# Patient Record
(demographics unavailable — no encounter records)

---

## 2025-03-15 NOTE — LETTER BODY
[FreeTextEntry1] : Malena Farmer MD [address] [place] [address] [phone]  Dear Dr. Farmer,   Reason for visit: Urinary frequency.   This is a 55 year-old gentle with urinary frequency and urgency referred for evaluation. Patient reports urinary frequency and urgency every 1-2 hours. He denies any gross hematuria, dysuria or urinary incontinence. The patient does not smoke. His symptoms are aggravated by hydration. He has occasional nocturia. The patient denies any relieving factors. The patient denies any interference of function. The patient is entirely asymptomatic. He denies any history of glaucoma. All other review of systems are negative. He has no cancer in her family medical history. He has no previous surgical history. Past medical history, family history and social history were inquired and were noncontributory to current condition. The patient does not use tobacco or drink alcohol. Medications and allergies were reviewed. He has no known allergies to medication.   On examination, the patient is a well-appearing male in no acute distress. He is alert and oriented follows commands. He has normal mood and affect. He is normocephalic. Neck is supple. Oral no thrush. Respirations are unlabored. His abdomen is soft and nontender. Bladder is nonpalpable. No CVA tenderness. Neurologically he is grossly intact. No peripheral edema. Skin without gross abnormality.    ASSESSMENT: Urinary frequency.   I counseled the patient. I discussed the various etiologies of urinary frequency. I discussed the management options of the will to the patient. Given the moderate symptoms, I recommend that the patient begin a trial of Trospium. I discussed the potential side effects of the medication. I counseled the patient on its use and side effects. If the patient develops any side effects, the patient will discontinue the medication and contact me.  I recommended the patient obtain urinalysis and urine culture to evaluate for infections. He will also obtain PSA and CMP to establish baselines. Risks and alternatives were discussed. I answered the patient's questions. The patient will follow-up as directed and will contact me with any questions or concerns. Thank you for the opportunity to participate in the care of Mr. BURGESS. I will keep you updated on her progress.   PLAN: Trial of Trospium. Urinalysis. Urine culture. PSA. CMP. Follow up in one month.

## 2025-03-15 NOTE — LETTER BODY
[FreeTextEntry1] : Malena Farmer MD [address] [place] [address] [phone]  Dear Dr. Farmer,   Reason for visit: Urinary frequency.   This is a 55 year-old gentle with urinary frequency and urgency referred for evaluation. Patient reports urinary frequency and urgency every 1-2 hours. He denies any gross hematuria, dysuria or urinary incontinence. The patient does not smoke. His symptoms are aggravated by hydration. He has occasional nocturia. The patient denies any relieving factors. The patient denies any interference of function. The patient is entirely asymptomatic. He denies any history of glaucoma. All other review of systems are negative. He has no cancer in her family medical history. He has no previous surgical history. Past medical history, family history and social history were inquired and were noncontributory to current condition. The patient does not use tobacco or drink alcohol. Medications and allergies were reviewed. He has no known allergies to medication.   On examination, the patient is a well-appearing male in no acute distress. He is alert and oriented follows commands. He has normal mood and affect. He is normocephalic. Neck is supple. Oral no thrush. Respirations are unlabored. His abdomen is soft and nontender. Bladder is nonpalpable. No CVA tenderness. Neurologically he is grossly intact. No peripheral edema. Skin without gross abnormality.    ASSESSMENT: Urinary frequency.   I counseled the patient. I discussed the various etiologies of urinary frequency. I discussed the management options of the will to the patient. Given the moderate symptoms, I recommend that the patient begin a trial of Trospium. I discussed the potential side effects of the medication. I counseled the patient on its use and side effects. If the patient develops any side effects, the patient will discontinue the medication and contact me.  I recommended the patient obtain urinalysis and urine culture to evaluate for infections. He will also obtain PSA and CMP to establish baselines. Risks and alternatives were discussed. I answered the patient's questions. The patient will follow-up as directed and will contact me with any questions or concerns. Thank you for the opportunity to participate in the care of Mr. BURGESS. I will keep you updated on her progress.   PLAN: Trial of Trospium. Urinalysis. Urine culture. PSA. CMP. Follow up in one month.

## 2025-03-15 NOTE — ADDENDUM
[FreeTextEntry1] : Entered by Yo Holloway, acting as scribe for Dr. Gustavo Arizmendi. The documentation recorded by the scribe accurately reflects the service I personally performed and the decisions made by me.

## 2025-05-02 NOTE — LETTER BODY
[FreeTextEntry1] : Malena Farmer MD [address] [place] [address] [phone]  Dear Dr. Farmer,   Reason for visit: Urinary frequency. Proteinuria.  This is a 55 year-old gentle with urinary frequency and proteinuria. Patient returns today for follow-up. Since she was last seen, patient did not start Trospium as directed. However, he states that his urinary symptoms resolved spontaneously without medical therapy. His recent urinalysis demonstrated trace proteinuria, 30 mg/dL. He denies any gross hematuria, dysuria or urinary incontinence. The patient does not smoke. His symptoms are aggravated by hydration. He has occasional nocturia. The patient denies any relieving factors. The patient denies any interference of function. The patient is entirely asymptomatic. He denies any history of glaucoma. All other review of systems are negative. He has no cancer in her family medical history. He has no previous surgical history. Past medical history, family history and social history were inquired and were noncontributory to current condition. The patient does not use tobacco or drink alcohol. Medications and allergies were reviewed. He has no known allergies to medication.  On examination, the patient is a well-appearing male in no acute distress. He is alert and oriented follows commands. He has normal mood and affect. He is normocephalic. Neck is supple. Oral no thrush. Respirations are unlabored. His abdomen is soft and nontender. Bladder is nonpalpable. No CVA tenderness. Neurologically he is grossly intact. No peripheral edema. Skin without gross abnormality.  ASSESSMENT: Urinary frequency. Proteinuria.  I counseled the patient. In terms of his urinary frequency, the patient did not start Trospium as directed. However, he states that his symptoms resolved spontaneously without medical therapy. I reassured the patient. He may discontinue Trospium. In terms of his proteinuria, his previous urinalysis demonstrated evidence of trace proteinuria. I recommended he obtain a 24-hour urinalysis for proteinuria for further evaluation. Risks and alternatives were discussed. I answered the patient's questions. The patient will follow-up as directed and will contact me with any questions or concerns. Thank you for the opportunity to participate in the care of Mr. BURGESS. I will keep you updated on her progress.  PLAN: 24-hour urinalysis for proteinuria. Follow up in one year.

## 2025-05-02 NOTE — LETTER BODY
[FreeTextEntry1] : Malena Farmer MD [address] [place] [address] [phone]  Dear Dr. Farmer,   Reason for visit: Urinary frequency. Proteinuria.  This is a 55 year-old gentle with urinary frequency and proteinuria. Patient returns today for follow-up. Since she was last seen, patient did not start Trospium as directed. However, he states that his urinary symptoms resolved spontaneously without medical therapy. His recent urinalysis demonstrated trace proteinuria, 30 mg/dL. He denies any gross hematuria, dysuria or urinary incontinence. The patient does not smoke. His symptoms are aggravated by hydration. He has occasional nocturia. The patient denies any relieving factors. The patient denies any interference of function. The patient is entirely asymptomatic. He denies any history of glaucoma. All other review of systems are negative. He has no cancer in her family medical history. He has no previous surgical history. Past medical history, family history and social history were inquired and were noncontributory to current condition. The patient does not use tobacco or drink alcohol. Medications and allergies were reviewed. He has no known allergies to medication.  On examination, the patient is a well-appearing male in no acute distress. He is alert and oriented follows commands. He has normal mood and affect. He is normocephalic. Neck is supple. Oral no thrush. Respirations are unlabored. His abdomen is soft and nontender. Bladder is nonpalpable. No CVA tenderness. Neurologically he is grossly intact. No peripheral edema. Skin without gross abnormality.  ASSESSMENT: Urinary frequency. Proteinuria.  I counseled the patient. In terms of his urinary frequency, the patient did not start Trospium as directed. However, he states that his symptoms resolved spontaneously without medical therapy. I reassured the patient. He may discontinue Trospium. In terms of his proteinuria, his previous urinalysis demonstrated evidence of trace proteinuria. I recommended he obtain a 24-hour urinalysis for proteinuria for further evaluation. Risks and alternatives were discussed. I answered the patient's questions. The patient will follow-up as directed and will contact me with any questions or concerns. Thank you for the opportunity to participate in the care of Mr. BURGESS. I will keep you updated on her progress.  PLAN: 24-hour urinalysis for proteinuria. Follow up in one year.

## 2025-05-02 NOTE — HISTORY OF PRESENT ILLNESS
[FreeTextEntry1] : F/U for urinary frequency, trail of Trospium which patient did not start, reports symptom improved without medication.  UA in March 2025 shows protein 30  Please refer to URO Consult Note.